# Patient Record
Sex: FEMALE | Race: WHITE | NOT HISPANIC OR LATINO | Employment: OTHER | URBAN - METROPOLITAN AREA
[De-identification: names, ages, dates, MRNs, and addresses within clinical notes are randomized per-mention and may not be internally consistent; named-entity substitution may affect disease eponyms.]

---

## 2020-12-09 VITALS — HEIGHT: 66 IN | WEIGHT: 147 LBS | BODY MASS INDEX: 23.63 KG/M2

## 2020-12-09 RX ORDER — MULTIPLE VITAMINS W/ MINERALS TAB 9MG-400MCG
1 TAB ORAL
COMMUNITY
Start: 2020-10-16

## 2020-12-09 RX ORDER — ASCORBIC ACID 500 MG
500 TABLET ORAL
COMMUNITY
Start: 2020-10-16

## 2020-12-09 RX ORDER — ACETAMINOPHEN 160 MG
TABLET,DISINTEGRATING ORAL
COMMUNITY
Start: 2020-10-28

## 2020-12-09 RX ORDER — CHLORAL HYDRATE 500 MG
1000 CAPSULE ORAL
COMMUNITY
Start: 2020-10-16

## 2020-12-09 ASSESSMENT — MIFFLIN-ST. JEOR: SCORE: 1218.54

## 2020-12-09 NOTE — PROGRESS NOTES
"Tenisha Bhakta is a 65 year old female who is being evaluated via a billable telephone visit.      The patient has been notified of following:     \"This telephone visit will be conducted via a call between you and your physician/provider. We have found that certain health care needs can be provided without the need for a physical exam.  This service lets us provide the care you need with a short phone conversation.  If a prescription is necessary we can send it directly to your pharmacy.  If lab work is needed we can place an order for that and you can then stop by our lab to have the test done at a later time.    Telephone visits are billed at different rates depending on your insurance coverage. During this emergency period, for some insurers they may be billed the same as an in-person visit.  Please reach out to your insurance provider with any questions.    If during the course of the call the physician/provider feels a telephone visit is not appropriate, you will not be charged for this service.\"    Patient has given verbal consent for Telephone visit?  Yes    Phone call duration: 36 minutes    Serjio Evans MD    Sleep Consultation:    Date on this visit: 12/10/2020    Tenisha Bhakta is sent by No ref. provider found for a sleep consultation regarding sleep diffiuclty.    Primary Physician: No Ref-Primary, Physician     Chief complaint: insomnia     Presenting History:     Tenisha Bhakta reports difficulty staying asleep for last 17 years.     Patient denies significant medical comorbidity except mildly elevated blood pressure.     Patient started having difficulty falling asleep and staying asleep in 2003, precipitated by change in job and increased stress. Since then sleep difficulty have evolved into chronic insomnia     Tenisha goes to sleep at 9:30 PM during the week. She wakes up at 5:00 AM without an alarm. She falls asleep in 5 minutes.  Tenisha denies difficulty falling asleep.  She wakes up 2-4 times a night " for 10 minutes before falling back to sleep.  Occasionally, 1-2 times per month, she has difficulty returning to sleep for a longer period of and hour or more. Tenisha wakes up to uncertain reasons or to use the bathroom.  On weekends, Tenisha goes to sleep at 10:00 PM.  She wakes up at 7:00 AM without an alarm. She falls asleep in 10 minutes.  Patient gets an average of 7 hours of sleep per night.     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     Tenisha does snore every night. Patient does have a regular bed partner. Snoring is loud and can be heard outside the room. There is report of snoring.  She does not have witnessed apneas. They never sleep separately.  Patient sleeps on her side. She denies no morning headaches and restless legs. Tenisha denies any bruxism, sleep walking, sleep talking, dream enactment, sleep paralysis, cataplexy and hypnogogic/hypnopompic hallucinations.    Tenisha denies difficulty breathing through her nose.      Patient's [place] Sleepiness score 0/24 consistent with no daytime sleepiness.      Tenisha naps 0 times per week . She takes no inadvertant naps.  She denies closing eyes, dozing and falling asleep while driving.  Patient was counseled on the importance of driving while alert, to pull over if drowsy, or nap before getting into the vehicle if sleepy.      She uses 2 cups/day of coffee. Last caffeine intake is usually before noon.    Allergies:    Allergies   Allergen Reactions     Milk Protein Extract        Medications:    Current Outpatient Medications   Medication Sig Dispense Refill     cholecalciferol (VITAMIN D3) 25 mcg (1000 units) capsule Take 1,000 Units by mouth       Cholecalciferol (VITAMIN D3) 50 MCG (2000 UT) CAPS TAKE 1 CAPSULE BY MOUTH EVERY DAY       multivitamin w/minerals (MULTI-VITAMIN) tablet Take 1 tablet by mouth       Omega-3 1000 MG capsule Take 1,000 mg by mouth       vitamin C (ASCORBIC ACID) 500 MG tablet Take 500 mg by mouth         Problem  List:  There are no active problems to display for this patient.       Past Medical/Surgical History:    - Nothing significant     Social History:  Social History     Socioeconomic History     Marital status:      Spouse name: Not on file     Number of children: Not on file     Years of education: Not on file     Highest education level: Not on file   Occupational History     Not on file   Social Needs     Financial resource strain: Not on file     Food insecurity     Worry: Not on file     Inability: Not on file     Transportation needs     Medical: Not on file     Non-medical: Not on file   Tobacco Use     Smoking status: Never Smoker     Smokeless tobacco: Never Used   Substance and Sexual Activity     Alcohol use: Not on file     Drug use: Not on file     Sexual activity: Not on file   Lifestyle     Physical activity     Days per week: Not on file     Minutes per session: Not on file     Stress: Not on file   Relationships     Social connections     Talks on phone: Not on file     Gets together: Not on file     Attends Yazdanism service: Not on file     Active member of club or organization: Not on file     Attends meetings of clubs or organizations: Not on file     Relationship status: Not on file     Intimate partner violence     Fear of current or ex partner: Not on file     Emotionally abused: Not on file     Physically abused: Not on file     Forced sexual activity: Not on file   Other Topics Concern     Not on file   Social History Narrative     Not on file       Family History:    - No family history of sleep disorders     Review of Systems:  A complete review of systems reviewed by me is negative with the exeption of what has been mentioned in the history of present illness.  CONSTITUTIONAL: NEGATIVE for weight gain/loss, fever, chills, sweats or night sweats, drug allergies.  EYES: NEGATIVE for changes in vision, blind spots, double vision.  ENT: NEGATIVE for ear pain, sore throat, sinus pain,  "post-nasal drip, runny nose, bloody nose  CARDIAC: NEGATIVE for fast heartbeats or fluttering in chest, chest pain or pressure, breathlessness when lying flat, swollen legs or swollen feet.  NEUROLOGIC: NEGATIVE headaches, weakness or numbness in the arms or legs.  DERMATOLOGIC: NEGATIVE for rashes, new moles or change in mole(s)  PULMONARY: NEGATIVE SOB at rest, SOB with activity, dry cough, productive cough, coughing up blood, wheezing or whistling when breathing.    GASTROINTESTINAL: NEGATIVE for nausea or vomitting, loose or watery stools, fat or grease in stools, constipation, abdominal pain, bowel movements black in color or blood noted.  GENITOURINARY: NEGATIVE for pain during urination, blood in urine, urinating more frequently than usual, irregular menstrual periods.  MUSCULOSKELETAL: NEGATIVE for muscle pain, bone or joint pain, swollen joints.  ENDOCRINE: NEGATIVE for increased thirst or urination, diabetes.  LYMPHATIC: NEGATIVE for swollen lymph nodes, lumps or bumps in the breasts or nipple discharge.    Screening:  Vitals: Ht 1.676 m (5' 6\")   Wt 66.7 kg (147 lb)   BMI 23.73 kg/m    BMI= Body mass index is 23.73 kg/m .         [place] Total Score 12/9/2020   Total score - Pony 0       [employer] Total Score: 12 (12/09/20 1358)    Impression/Plan:    1. To rule out obstructive sleep apnea    Patient is a 65 years old female, BMI 23, who presents with complaints of frequent nocturnal arousals, poor sleep qulaity and concentration difficulty. There is a history of loud snoring which raises possibility of sleep disordered breathing. An overnight sleep study is recommended to assess for any significant sleep apnea contributing to her symptoms. Patient opts for home sleep testing.     Plan:     1. Home sleep apnea testing     2. Chronic Insomnia     Patient has chronic psychophysiologic insomnia. She averages about 6-7 hours of nocturnal sleep with 3-4 short arousals without significant wake periods after " sleep onset. She doesnot have significant sleep initiation insomnia. Daytime consequence of insomnia is reported to be concentration difficulty. Overall, I will classify her insomnia as mild and she seems to have sleep related anxiety. I do not see significant behavioral changes that need to be made as she has good sleep hygiene. If clinically significant sleep apnea is absent, I do not think other interventions are needed. She was offered referral to Behavioral sleep medicine but she doesnot think she will benefit significantly. She has previously worked with a functional medicine specialist which included behavioral interventions for sleep.     Literature provided regarding sleep apnea.      She will follow up with me in approximately two weeks after her sleep study has been competed to review the results and discuss plan of care.       Polysomnography & HST reviewed.  Limitations of HST reviewed.   Obstructive sleep apnea reviewed.  Complications of untreated sleep apnea were reviewed.    I spent a total of 36 minutes with patient with more than 50% in cosuling     Dr. Serjio Evans     CC: No ref. provider found

## 2020-12-10 ENCOUNTER — VIRTUAL VISIT (OUTPATIENT)
Dept: SLEEP MEDICINE | Facility: CLINIC | Age: 65
End: 2020-12-10
Payer: COMMERCIAL

## 2020-12-10 DIAGNOSIS — G47.9 SLEEP DISTURBANCE: ICD-10-CM

## 2020-12-10 DIAGNOSIS — F51.04 CHRONIC INSOMNIA: ICD-10-CM

## 2020-12-10 DIAGNOSIS — R06.83 SNORING: ICD-10-CM

## 2020-12-10 DIAGNOSIS — R29.818 SUSPECTED SLEEP APNEA: Primary | ICD-10-CM

## 2020-12-10 PROCEDURE — 99203 OFFICE O/P NEW LOW 30 MIN: CPT | Mod: TEL | Performed by: INTERNAL MEDICINE

## 2020-12-10 NOTE — PATIENT INSTRUCTIONS
"MY TREATMENT INFORMATION FOR SLEEP APNEA-  Tenisha Bhakta    DOCTOR : Serjio Evans MD, MD    Am I having a sleep study at a sleep center?  --->Due to insurance clearance delays, you will be contacted within 2-4 weeks to schedule    Am I having a home sleep study?  --->Watch the video for the device you are using:    /drop off device-   https://www.Sourcebits.com/watch?v=yGGFBdELGhk    Disposable device sent out require phone/computer application-   https://www.Sourcebits.com/watch?v=BCce_vbiwxE      Frequently asked questions:  1. What is Obstructive Sleep Apnea (ABRAHAM)? ABRAHAM is the most common type of sleep apnea. Apnea means, \"without breath.\"  Apnea is most often caused by narrowing or collapse of the upper airway as muscles relax during sleep.   Almost everyone has occasional apneas. Most people with sleep apnea have had brief interruptions at night frequently for many years.  The severity of sleep apnea is related to how frequent and severe the events are.   2. What are the consequences of ABRAHAM? Symptoms include: feeling sleepy during the day, snoring loudly, gasping or stopping of breathing, trouble sleeping, and occasionally morning headaches or heartburn at night.  Sleepiness can be serious and even increase the risk of falling asleep while driving. Other health consequences may include development of high blood pressure and other cardiovascular disease in persons who are susceptible. Untreated ABRAHAM  can contribute to heart disease, stroke and diabetes.   3. What are the treatment options? In most situations, sleep apnea is a lifelong disease that must be managed with daily therapy. Medications are not effective for sleep apnea and surgery is generally not considered until other therapies have been tried. Your treatment is your choice . Continuous Positive Airway (CPAP) works right away and is the therapy that is effective in nearly everyone. An oral device to hold your jaw forward is usually the next most " reliable option. Other options include postioning devices (to keep you off your back), weight loss, and surgery including a tongue pacing device. There is more detail about some of these options below.  4. Are my sleep studies covered by insurance? Although we will request verification of coverage, we advise you also check in advance of the study to ensure there is coverage.    Important tips for those choosing CPAP and similar devices   Know your equipment:  CPAP is continuous positive airway pressure that prevents obstructive sleep apnea by keeping the throat from collapsing while you are sleeping. In most cases, the device is  smart  and can slowly self-adjusts if your throat collapses and keeps a record every day of how well you are treated-this information is available to you and your care team.  BPAP is bilevel positive airway pressure that keeps your throat open and also assists each breath with a pressure boost to maintain adequate breathing.  Special kinds of BPAP are used in patients who have inadequate breathing from lung or heart disease. In most cases, the device is  smart  and can slowly self-adjusts to assist breathing. Like CPAP, the device keeps a record of how well you are treated.  Your mask is your connection to the device. You get to choose what feels most comfortable and the staff will help to make sure if fits. Here: are some examples of the different masks that are available:       Key points to remember on your journey with sleep apnea:  1. Sleep study.  PAP devices often need to be adjusted during a sleep study to show that they are effective and adjusted right.  2. Good tips to remember: Try wearing just the mask during a quiet time during the day so your body adapts to wearing it. A humidifier is recommended for comfort in most cases to prevent drying of your nose and throat. Allergy medication from your provider may help you if you are having nasal congestion.  3. Getting settled-in. It  takes more than one night for most of us to get used to wearing a mask. Try wearing just the mask during a quiet time during the day so your body adapts to wearing it. A humidifier is recommended for comfort in most cases. Our team will work with you carefully on the first day and will be in contact within 4 days and again at 2 and 4 weeks for advice and remote device adjustments. Your therapy is evaluated by the device each day.   4. Use it every night. The more you are able to sleep naturally for 7-8 hours, the more likely you will have good sleep and to prevent health risks or symptoms from sleep apnea. Even if you use it 4 hours it helps. Occasionally all of us are unable to use a medical therapy, in sleep apnea, it is not dangerous to miss one night.   5. Communicate. Call our skilled team on the number provided on the first day if your visit for problems that make it difficult to wear the device. Over 2 out of 3 patients can learn to wear the device long-term with help from our team. Remember to call our team or your sleep providers if you are unable to wear the device as we may have other solutions for those who cannot adapt to mask CPAP therapy. It is recommended that you sleep your sleep provider within the first 3 months and yearly after that if you are not having problems.   6. Use it for your health. We encourage use of CPAP masks during daytime quiet periods to allow your face and brain to adapt to the sensation of CPAP so that it will be a more natural sensation to awaken to at night or during naps. This can be very useful during the first few weeks or months of adapting to CPAP though it does not help medically to wear CPAP during wakefulness and  should not be used as a strategy just to meet guidelines.  7. Take care of your equipment. Make sure you clean your mask and tubing using directions every day and that your filter and mask are replaced as recommended or if they are not working.     BESIDES  CPAP, WHAT OTHER THERAPIES ARE THERE?    Positioning Device  Positioning devices are generally used when sleep apnea is mild and only occurs on your back.This example shows a pillow that straps around the waist. It may be appropriate for those whose sleep study shows milder sleep apnea that occurs primarily when lying flat on one's back. Preliminary studies have shown benefit but effectiveness at home may need to be verified by a home sleep test. These devices are generally not covered by medical insurance.  Examples of devices that maintain sleeping on the back to prevent snoring and mild sleep apnea.    Belt type body positioner  http://[email or web address] Sound/    Electronic reminder  http://nightshifttherapy.com/  http://[email or web address] [email or web address]/      Oral Appliance  What is oral appliance therapy?  An oral appliance device fits on your teeth at night like a retainer used after having braces. The device is made by a specialized dentist and requires several visits over 1-2 months before a manufactured device is made to fit your teeth and is adjusted to prevent your sleep apnea. Once an oral device is working properly, snoring should be improved. A home sleep test may be recommended at that time if to determine whether the sleep apnea is adequately treated.       Some things to remember:  -Oral devices are often, but not always, covered by your medical insurance. Be sure to check with your insurance provider.   -If you are referred for oral therapy, you will be given a list of specialized dentists to consider or you may choose to visit the Web site of the American Academy of Dental Sleep Medicine  -Oral devices are less likely to work if you have severe sleep apnea or are extremely overweight.     More detailed information  An oral appliance is a small acrylic device that fits over the upper and lower teeth  (similar to a retainer or a mouth guard). This device slightly moves jaw forward, which moves the base of the tongue forward,  opens the airway, improves breathing for effective treat snoring and obstructive sleep apnea in perhaps 7 out of 10 people .  The best working devices are custom-made by a dental device  after a mold is made of the teeth 1, 2, 3.  When is an oral appliance indicated?  Oral appliance therapy is recommended as a first-line treatment for patients with primary snoring, mild sleep apnea, and for patients with moderate sleep apnea who prefer appliance therapy to use of CPAP4, 5. Severity of sleep apnea is determined by sleep testing and is based on the number of respiratory events per hour of sleep.   How successful is oral appliance therapy?  The success rate of oral appliance therapy in patients with mild sleep apnea is 75-80% while in patients with moderate sleep apnea it is 50-70%. The chance of success in patients with severe sleep apnea is 40-50%. The research also shows that oral appliances have a beneficial effect on the cardiovascular health of ABRAHAM patients at the same magnitude as CPAP therapy7.  Oral appliances should be a second-line treatment in cases of severe sleep apnea, but if not completely successful then a combination therapy utilizing CPAP plus oral appliance therapy may be effective. Oral appliances tend to be effective in a broad range of patients although studies show that the patients who have the highest success are females, younger patients, those with milder disease, and less severe obesity. 3, 6.   Finding a dentist that practices dental sleep medicine  Specific training is available through the American Academy of Dental Sleep Medicine for dentists interested in working in the field of sleep. To find a dentist who is educated in the field of sleep and the use of oral appliances, near you, visit the Web site of the American Academy of Dental Sleep Medicine.    References  1. Zay et al. Objectively measured vs self-reported compliance during oral appliance therapy for  sleep-disordered breathing. Chest 2013; 144(5): 5463-4801.  2. Bekah, et al. Objective measurement of compliance during oral appliance therapy for sleep-disordered breathing. Thorax 2013; 68(1): 91-96.  3. Salbador et al. Mandibular advancement devices in 620 men and women with ABRAHAM and snoring: tolerability and predictors of treatment success. Chest 2004; 125: 4609-0325.  4. Jose et al. Oral appliances for snoring and ABRAHAM: a review. Sleep 2006; 29: 244-262.  5. [place] et al. Oral appliance treatment for ABRAHAM: an update. J Clin Sleep Med 2014; 10(2): 215-227.  6. Bob et al. Predictors of OSAH treatment outcome. J Dent Res 2007; 86: 7450-0841.      Weight Loss:    Weight loss is a long-term strategy that may improve sleep apnea in some patients.    Weight management is a personal decision and the decision should be based on your interest and the potential benefits.  If you are interested in exploring weight loss strategies, the following discussion covers the impact on weight loss on sleep apnea and the approaches that may be successful.    Being overweight does not necessarily mean you will have health consequences.  Those who have BMI over 35 or over 27 with existing medical conditions carries greater risk.   Weight loss decreases severity of sleep apnea in most people with obesity. For those with mild obesity who have developed snoring with weight gain, even 15-30 pound weight loss can improve and occasionally eliminate sleep apnea.  Structured and life-long dietary and health habits are necessary to lose weight and keep healthier weight levels.     Though there may be significant health benefits from weight loss, long-term weight loss is very difficult to achieve- studies show success with dietary management in less than 10% of people. In addition, substantial weight loss may require years of dietary control and may be difficult if patients have severe obesity. In these cases, surgical  management may be considered.  Finally, older individuals who have tolerated obesity without health complications may be less likely to benefit from weight loss strategies.        Your BMI is Body mass index is 23.73 kg/m .  Weight management is a personal decision.  If you are interested in exploring weight loss strategies, the following discussion covers the approaches that may be successful. Body mass index (BMI) is one way to tell whether you are at a healthy weight, overweight, or obese. It measures your weight in relation to your height.  A BMI of 18.5 to 24.9 is in the healthy range. A person with a BMI of 25 to 29.9 is considered overweight, and someone with a BMI of 30 or greater is considered obese. More than two-thirds of American adults are considered overweight or obese.  Being overweight or obese increases the risk for further weight gain. Excess weight may lead to heart disease and diabetes.  Creating and following plans for healthy eating and physical activity may help you improve your health.  Weight control is part of healthy lifestyle and includes exercise, emotional health, and healthy eating habits. Careful eating habits lifelong are the mainstay of weight control. Though there are significant health benefits from weight loss, long-term weight loss with diet alone may be very difficult to achieve- studies show long-term success with dietary management in less than 10% of people. Attaining a healthy weight may be especially difficult to achieve in those with severe obesity. In some cases, medications, devices and surgical management might be considered.  What can you do?  If you are overweight or obese and are interested in methods for weight loss, you should discuss this with your provider.     Consider reducing daily calorie intake by 500 calories.     Keep a food journal.     Avoiding skipping meals, consider cutting portions instead.    Diet combined with exercise helps maintain muscle while  optimizing fat loss. Strength training is particularly important for building and maintaining muscle mass. Exercise helps reduce stress, increase energy, and improves fitness. Increasing exercise without diet control, however, may not burn enough calories to loose weight.       Start walking three days a week 10-20 minutes at a time    Work towards walking thirty minutes five days a week     Eventually, increase the speed of your walking for 1-2 minutes at time    In addition, we recommend that you review healthy lifestyles and methods for weight loss available through the National Institutes of Health patient information sites:  http://win.niddk.nih.gov/publications/index.htm    And look into health and wellness programs that may be available through your health insurance provider, employer, local community center, or alena club.    Surgery:    Surgery for obstructive sleep apnea is considered generally only when other therapies fail to work. Surgery may be discussed with you if you are having a difficult time tolerating CPAP and or when there is an abnormal structure that requires surgical correction.  Nose and throat surgeries often enlarge the airway to prevent collapse.  Most of these surgeries create pain for 1-2 weeks and up to half of the most common surgeries are not effective throughout life.  You should carefully discuss the benefits and drawbacks to surgery with your sleep provider and surgeon to determine if it is the best solution for you.   More information  Surgery for ABRAHAM is directed at areas that are responsible for narrowing or complete obstruction of the airway during sleep.  There are a wide range of procedures available to enlarge and/or stabilize the airway to prevent blockage of breathing in the three major areas where it can occur: the palate, tongue, and nasal regions.  Successful surgical treatment depends on the accurate identification of the factors responsible for obstructive sleep  apnea in each person.  A personalized approach is required because there is no single treatment that works well for everyone.  Because of anatomic variation, consultation with an examination by a sleep surgeon is a critical first step in determining what surgical options are best for each patient.  In some cases, examination during sedation may be recommended in order to guide the selection of procedures.  Patients will be counseled about risks and benefits as well as the typical recovery course after surgery. Surgery is typically not a cure for a person s ABRAHAM.  However, surgery will often significantly improve one s ABRAHAM severity (termed  success rate ).  Even in the absence of a cure, surgery will decrease the cardiovascular risk associated with OSA7; improve overall quality of life8 (sleepiness, functionality, sleep quality, etc).      Palate Procedures:  Patients with ABRAHAM often have narrowing of their airway in the region of their tonsils and uvula.  The goals of palate procedures are to widen the airway in this region as well as to help the tissues resist collapse.  Modern palate procedure techniques focus on tissue conservation and soft tissue rearrangement, rather than tissue removal.  Often the uvula is preserved in this procedure. Residual sleep apnea is common in patient after pharyngoplasty with an average reduction in sleep apnea events of 33%2.      Tongue Procedures:  ExamWhile patients are awake, the muscles that surround the throat are active and keep this region open for breathing. These muscles relax during sleep, allowing the tongue and other structures to collapse and block breathing.  There are several different tongue procedures available.  Selection of a tongue base procedure depends on characteristics seen on physical exam.  Generally, procedures are aimed at removing bulky tissues in this area or preventing the back of the tongue from falling back during sleep.  Success rates for tongue  surgery range from 50-62%3.    Hypoglossal Nerve Stimulation:  Hypoglossal nerve stimulation has recently received approval from the [place] Food and Drug Administration for the treatment of obstructive sleep apnea.  This is based on research showing that the system was safe and effective in treating sleep apnea6.  Results showed that the median AHI score decreased 68%, from 29.3 to 9.0. This therapy uses an implant system that senses breathing patterns and delivers mild stimulation to airway muscles, which keeps the airway open during sleep.  The system consists of three fully implanted components: a small generator (similar in size to a pacemaker), a breathing sensor, and a stimulation lead.  Using a small handheld remote, a patient turns the therapy on before bed and off upon awakening.    Candidates for this device must be greater than 22 years of age, have moderate to severe ABRAHAM (AHI between 20-65), BMI less than 32, have tried CPAP/oral appliance without success, and have appropriate upper airway anatomy (determined by a sleep endoscopy performed by Dr. Villanueva).    Hypoglossal Nerve Stimulation Pathway:    The sleep surgeon s office will work with the patient through the insurance prior-authorization process (including communications and appeals).    Nasal Procedures:  Nasal obstruction can interfere with nasal breathing during the day and night.  Studies have shown that relief of nasal obstruction can improve the ability of some patients to tolerate positive airway pressure therapy for obstructive sleep apnea1.  Treatment options include medications such as nasal saline, topical corticosteroid and antihistamine sprays, and oral medications such as antihistamines or decongestants. Non-surgical treatments can include external nasal dilators for selected patients. If these are not successful by themselves, surgery can improve the nasal airway either alone or in combination with these other  options.      Combination Procedures:  Combination of surgical procedures and other treatments may be recommended, particularly if patients have more than one area of narrowing or persistent positional disease.  The success rate of combination surgery ranges from 66-80%2,3.    References  1. Melanie CARNES. The Role of the Nose in Snoring and Obstructive Sleep Apnoea: An Update.  Eur Arch Otorhinolaryngol. 2011; 268: 1365-73.  2.  Candido SM; Gwendolyn JA; Joel JR; Pallanch JF; Lamont MB; Sim SG; Samir SHELTON. Surgical modifications of the upper airway for obstructive sleep apnea in adults: a systematic review and meta-analysis. SLEEP 2010;33(10):3596-1125. Clementina DENG. Hypopharyngeal surgery in obstructive sleep apnea: an evidence-based medicine review.  Arch Otolaryngol Head Neck Surg. 2006 Feb;132(2):206-13.  3. Kimani YH1, [place], Rey PAYAL. The efficacy of anatomically based multilevel surgery for obstructive sleep apnea. Otolaryngol Head Neck Surg. 2003 Oct;129(4):327-35.  4. Clementina DENG, Goldberg A. Hypopharyngeal Surgery in Obstructive Sleep Apnea: An Evidence-Based Medicine Review. Arch Otolaryngol Head Neck Surg. 2006 Feb;132(2):206-13.  5. Burton WRIGHT et al. Upper-Airway Stimulation for Obstructive Sleep Apnea.  N Engl J Med. 2014 Jan 9;370(2):139-49.  6. Eduardo Y et al. Increased Incidence of Cardiovascular Disease in Middle-aged Men with Obstructive Sleep Apnea. Am J Respir Crit Care Med; 2002 166: 159-165  7. Brown EM et al. Studying Life Effects and Effectiveness of Palatopharyngoplasty (SLEEP) study: Subjective Outcomes of Isolated Uvulopalatopharyngoplasty. Otolaryngol Head Neck Surg. 2011; 144: 623-631.

## 2022-04-19 ENCOUNTER — HOSPITAL ENCOUNTER (OUTPATIENT)
Dept: MRI IMAGING | Facility: CLINIC | Age: 67
Discharge: HOME | End: 2022-04-19
Attending: ORTHOPAEDIC SURGERY | Admitting: ORTHOPAEDIC SURGERY
Payer: MEDICARE

## 2022-04-19 DIAGNOSIS — M25.562 LEFT KNEE PAIN: ICD-10-CM

## 2022-04-19 PROCEDURE — 73721 MRI JNT OF LWR EXTRE W/O DYE: CPT | Mod: 26 | Performed by: RADIOLOGY

## 2022-04-19 PROCEDURE — 73721 MRI JNT OF LWR EXTRE W/O DYE: CPT | Mod: LT

## 2022-08-09 ENCOUNTER — APPOINTMENT (OUTPATIENT)
Dept: OCCUPATIONAL MEDICINE | Facility: CLINIC | Age: 67
End: 2022-08-09

## 2022-08-09 PROCEDURE — 99000 SPECIMEN HANDLING OFFICE-LAB: CPT | Performed by: FAMILY MEDICINE

## 2022-08-09 PROCEDURE — 99499 UNLISTED E&M SERVICE: CPT | Performed by: FAMILY MEDICINE

## 2022-08-09 PROCEDURE — 97799 UNLISTED PHYSCL MED/REHAB PX: CPT | Performed by: FAMILY MEDICINE

## 2023-07-20 ENCOUNTER — OFFICE VISIT (OUTPATIENT)
Dept: FAMILY MEDICINE | Facility: CLINIC | Age: 68
End: 2023-07-20
Payer: MEDICARE

## 2023-07-20 VITALS
BODY MASS INDEX: 23.02 KG/M2 | WEIGHT: 142.6 LBS | RESPIRATION RATE: 14 BRPM | DIASTOLIC BLOOD PRESSURE: 82 MMHG | SYSTOLIC BLOOD PRESSURE: 130 MMHG | OXYGEN SATURATION: 97 % | HEART RATE: 63 BPM

## 2023-07-20 DIAGNOSIS — M26.609 TMJ (TEMPOROMANDIBULAR JOINT SYNDROME): Primary | ICD-10-CM

## 2023-07-20 PROCEDURE — 99203 OFFICE O/P NEW LOW 30 MIN: CPT | Performed by: FAMILY MEDICINE

## 2023-07-20 ASSESSMENT — PAIN SCALES - GENERAL: PAINLEVEL: NO PAIN (0)

## 2023-07-20 NOTE — PROGRESS NOTES
Assessment & Plan     TMJ (temporomandibular joint syndrome)  Recommend meeting with PT. Can use NSAIDS as needed.  - Physical Therapy Referral          MATTEO BURGOS DO  [facility]    Lucila Steven is a 68 year old, presenting for the following health issues:  Referral        7/20/2023    10:46 AM   Additional Questions   Roomed by Shelby MADSEN MA   Accompanied by Self     History of Present Illness       Reason for visit:  Referral from medical doctor required for PT    She eats 4 or more servings of fruits and vegetables daily.She consumes 0 sweetened beverage(s) daily.She exercises with enough effort to increase her heart rate 60 or more minutes per day.  She exercises with enough effort to increase her heart rate 5 days per week.   She is taking medications regularly.       Was seen at the dentist and was told she needs to go to PT for her Jaw.  The dentist did write a referral, but when she called to schedule a PT appointment she was told that she needs a medical referral for insurance purposes.    -With opening the jaw and chewing.  Has tried chiropractic and is picking up mouthguard.  Hoping to visit with PT.  They are planning to move to [place] in 1 months.  We will plan to reestablish care down there.  Previously had been seen through [facility]    -Denies chest pain or SOB    Review of Systems   Constitutional, HEENT, cardiovascular, pulmonary, gi and gu systems are negative, except as otherwise noted.      Objective    /82 (BP Location: Right arm, Patient Position: Chair, Cuff Size: Adult Regular)   Pulse 63   Resp 14   Wt 64.7 kg (142 lb 9.6 oz)   SpO2 97%   BMI 23.02 kg/m    Body mass index is 23.02 kg/m .  Physical Exam  Constitutional:       Appearance: Normal appearance.   HENT:      Head: Normocephalic and atraumatic.      Right Ear: Tympanic membrane normal.      Left Ear: Tympanic membrane normal.      Nose: Nose normal.      Mouth/Throat:      Mouth:  Mucous membranes are moist.   Eyes:      Conjunctiva/sclera: Conjunctivae normal.   Pulmonary:      Effort: Pulmonary effort is normal.   Musculoskeletal:      Cervical back: Normal range of motion and neck supple. No rigidity.   Neurological:      Mental Status: She is alert.   Psychiatric:         Thought Content: Thought content normal.         Judgment: Judgment normal.

## 2023-07-31 ENCOUNTER — TRANSFERRED RECORDS (OUTPATIENT)
Dept: HEALTH INFORMATION MANAGEMENT | Facility: CLINIC | Age: 68
End: 2023-07-31
Payer: MEDICARE

## 2023-08-07 ENCOUNTER — TELEPHONE (OUTPATIENT)
Dept: FAMILY MEDICINE | Facility: CLINIC | Age: 68
End: 2023-08-07
Payer: MEDICARE

## 2023-08-07 NOTE — LETTER
August 7, 2023      Tenisha Bhakta  [address] [facility] [facility] [address]        Dear Tenisha,       Your team at [facility] cares about your health. We have reviewed your chart and based on our findings; we are making the following recommendations to better manage your health.     You are in particular need of attention regarding the following:     Schedule Annual MAMMOGRAPHY. [facility] scheduling number is [phone] or schedule in [facility] (self referral).  1 in 8 women will develop invasive breast cancer during her lifetime and it is the most common non-skin cancer in [place] Women. EARLY detection, new treatments, and a better understanding of the disease have increased survival rates- the 5 year survival rate in the 1960's was 63% and today it is close to 90%.  PREVENTATIVE VISIT: Annual Medicare Wellness:Schedule an Annual Medicare Wellness Exam. Please call your [facility] [place] [facility] to set up your appointment.    If you have already completed these items, please contact the clinic via phone or   [facility] so your care team can review and update your records. Thank you for   choosing [facility] for your healthcare needs. For any questions,   concerns, or to schedule an appointment please contact our clinic.    Healthy Regards,      Your [facility] Care Team

## 2023-10-28 NOTE — TELEPHONE ENCOUNTER
Patient Quality Outreach    Patient is due for the following:   Breast Cancer Screening - Mammogram  Physical Annual Wellness Visit    Next Steps:   Schedule a Annual Wellness Visit    Type of outreach:    Sent letter.      Questions for provider review:    None           Shelby Kenyon, [facility]        
No